# Patient Record
Sex: FEMALE
[De-identification: names, ages, dates, MRNs, and addresses within clinical notes are randomized per-mention and may not be internally consistent; named-entity substitution may affect disease eponyms.]

---

## 2024-08-09 ENCOUNTER — DASHBOARD ENCOUNTERS (OUTPATIENT)
Age: 44
End: 2024-08-09

## 2024-08-09 ENCOUNTER — LAB OUTSIDE AN ENCOUNTER (OUTPATIENT)
Dept: URBAN - METROPOLITAN AREA CLINIC 82 | Facility: CLINIC | Age: 44
End: 2024-08-09

## 2024-08-09 ENCOUNTER — OFFICE VISIT (OUTPATIENT)
Dept: URBAN - METROPOLITAN AREA CLINIC 82 | Facility: CLINIC | Age: 44
End: 2024-08-09
Payer: COMMERCIAL

## 2024-08-09 VITALS
HEIGHT: 63 IN | WEIGHT: 192 LBS | DIASTOLIC BLOOD PRESSURE: 88 MMHG | TEMPERATURE: 97.9 F | SYSTOLIC BLOOD PRESSURE: 132 MMHG | BODY MASS INDEX: 34.02 KG/M2 | HEART RATE: 91 BPM

## 2024-08-09 DIAGNOSIS — K76.9 LIVER LESION: ICD-10-CM

## 2024-08-09 DIAGNOSIS — R93.89 ABNORMAL CT SCAN: ICD-10-CM

## 2024-08-09 PROBLEM — 129679001: Status: ACTIVE | Noted: 2024-08-09

## 2024-08-09 PROBLEM — 300331000: Status: ACTIVE | Noted: 2024-08-09

## 2024-08-09 PROCEDURE — 99204 OFFICE O/P NEW MOD 45 MIN: CPT | Performed by: INTERNAL MEDICINE

## 2024-08-09 NOTE — HPI-TODAY'S VISIT:
44 years old pleasant -[place] female referred for evaluation of abnormal CT scan with a lesion in the liver of 3.5 cm.  Patient undergoing evaluation for surgery for hyperaldosteronism.  She denies of any jaundice or ascites he denies having abdominal pain denies of any risk factors for hepatitis including IV use of drugs or history of blood transfusions.had hepatitis in the past no loss of weight no loss of appetite.

## 2024-08-28 ENCOUNTER — TELEPHONE ENCOUNTER (OUTPATIENT)
Dept: URBAN - METROPOLITAN AREA CLINIC 82 | Facility: CLINIC | Age: 44
End: 2024-08-28

## 2024-10-14 ENCOUNTER — LAB OUTSIDE AN ENCOUNTER (OUTPATIENT)
Dept: URBAN - METROPOLITAN AREA CLINIC 82 | Facility: CLINIC | Age: 44
End: 2024-10-14

## 2024-11-01 ENCOUNTER — OFFICE VISIT (OUTPATIENT)
Dept: URBAN - METROPOLITAN AREA CLINIC 82 | Facility: CLINIC | Age: 44
End: 2024-11-01
Payer: COMMERCIAL

## 2024-11-01 VITALS
BODY MASS INDEX: 34.73 KG/M2 | HEIGHT: 63 IN | HEART RATE: 80 BPM | SYSTOLIC BLOOD PRESSURE: 159 MMHG | DIASTOLIC BLOOD PRESSURE: 103 MMHG | TEMPERATURE: 96.9 F | WEIGHT: 196 LBS

## 2024-11-01 DIAGNOSIS — D18.03 LIVER HEMANGIOMA: ICD-10-CM

## 2024-11-01 DIAGNOSIS — K76.9 LIVER LESION: ICD-10-CM

## 2024-11-01 DIAGNOSIS — Z12.11 SCREENING FOR COLON CANCER: ICD-10-CM

## 2024-11-01 DIAGNOSIS — R93.89 ABNORMAL CT SCAN: ICD-10-CM

## 2024-11-01 PROBLEM — 85057007: Status: ACTIVE | Noted: 2024-11-01

## 2024-11-01 PROCEDURE — 99214 OFFICE O/P EST MOD 30 MIN: CPT | Performed by: INTERNAL MEDICINE

## 2024-11-01 NOTE — PHYSICAL EXAM LYMPHATIC:
Neck , no lymphadenopathy Will continue current insulin regimen for now. Will continue monitoring  blood sugars, will Follow up.  DC home on Lantus 18u qhs, Janumet 50/1000mg PO BID, Farxiga 10mg daily. FU 2 weeks.

## 2024-11-01 NOTE — HPI-TODAY'S VISIT:
44 years old [place] -[place] female referred for evaluation of abnormal CT scan with a lesion in the liver of 3.5 cm.  Patient undergoing evaluation for surgery for hyperaldosteronism.  She denies of any jaundice or ascites he denies having abdominal pain denies of any risk factors for hepatitis including IV use of drugs or history of blood transfusions.had hepatitis in the past no loss of weight no loss of appetite.  11/1/2024 Patient presents for a follow up . Patient denies abdominal problems. Patient denies family history of colon cancer